# Patient Record
Sex: FEMALE | Race: WHITE | NOT HISPANIC OR LATINO | Employment: OTHER | URBAN - METROPOLITAN AREA
[De-identification: names, ages, dates, MRNs, and addresses within clinical notes are randomized per-mention and may not be internally consistent; named-entity substitution may affect disease eponyms.]

---

## 2017-02-13 ENCOUNTER — APPOINTMENT (OUTPATIENT)
Dept: WOMENS IMAGING | Facility: HOSPITAL | Age: 62
End: 2017-02-13

## 2017-02-13 ENCOUNTER — OFFICE VISIT (OUTPATIENT)
Dept: OBSTETRICS AND GYNECOLOGY | Facility: CLINIC | Age: 62
End: 2017-02-13

## 2017-02-13 ENCOUNTER — PROCEDURE VISIT (OUTPATIENT)
Dept: OBSTETRICS AND GYNECOLOGY | Facility: CLINIC | Age: 62
End: 2017-02-13

## 2017-02-13 VITALS
HEART RATE: 69 BPM | BODY MASS INDEX: 21.68 KG/M2 | WEIGHT: 110.4 LBS | DIASTOLIC BLOOD PRESSURE: 62 MMHG | HEIGHT: 60 IN | SYSTOLIC BLOOD PRESSURE: 126 MMHG

## 2017-02-13 DIAGNOSIS — Z01.411 ENCOUNTER FOR GYNECOLOGICAL EXAMINATION WITH ABNORMAL FINDING: Primary | ICD-10-CM

## 2017-02-13 DIAGNOSIS — N81.2 PELVIC RELAXATION DUE TO UTEROVAGINAL PROLAPSE, INCOMPLETE: ICD-10-CM

## 2017-02-13 DIAGNOSIS — Z12.31 VISIT FOR SCREENING MAMMOGRAM: Primary | ICD-10-CM

## 2017-02-13 PROBLEM — I10 HYPERTENSION: Status: ACTIVE | Noted: 2017-02-13

## 2017-02-13 PROBLEM — E78.5 HYPERLIPIDEMIA: Status: ACTIVE | Noted: 2017-02-13

## 2017-02-13 PROCEDURE — 77067 SCR MAMMO BI INCL CAD: CPT | Performed by: OBSTETRICS & GYNECOLOGY

## 2017-02-13 PROCEDURE — 99406 BEHAV CHNG SMOKING 3-10 MIN: CPT | Performed by: OBSTETRICS & GYNECOLOGY

## 2017-02-13 PROCEDURE — 99396 PREV VISIT EST AGE 40-64: CPT | Performed by: OBSTETRICS & GYNECOLOGY

## 2017-02-13 PROCEDURE — 77067 SCR MAMMO BI INCL CAD: CPT | Performed by: RADIOLOGY

## 2017-02-13 RX ORDER — BISOPROLOL FUMARATE AND HYDROCHLOROTHIAZIDE 10; 6.25 MG/1; MG/1
1 TABLET ORAL
COMMUNITY
Start: 2016-09-30

## 2017-02-13 RX ORDER — LISINOPRIL AND HYDROCHLOROTHIAZIDE 25; 20 MG/1; MG/1
1 TABLET ORAL
COMMUNITY
Start: 2016-09-30

## 2017-02-13 RX ORDER — ATORVASTATIN CALCIUM 20 MG/1
20 TABLET, FILM COATED ORAL
COMMUNITY
Start: 2016-12-08 | End: 2017-02-13 | Stop reason: SDUPTHER

## 2017-02-13 RX ORDER — ATORVASTATIN CALCIUM 20 MG/1
TABLET, FILM COATED ORAL
Refills: 5 | COMMUNITY
Start: 2017-01-11

## 2017-02-13 NOTE — PROGRESS NOTES
Karlos Adames is a 62 y.o. female  2, Para 2 AB 0, Living 2.  Last annual 10/2015, last pap 1y, last mammogram today, last colonoscopy .  Cc: Annual exam  History of Present Illness  She has a history of uterine and vaginal prolapse but is asymptomatic at this time.  No other complaints at this time  The following portions of the patient's history were reviewed and updated as appropriate: allergies, current medications, past family history, past medical history, past social history, past surgical history and problem list.    Review of Systems   Genitourinary:        Denies any vaginal symptoms from her prolapse   All other systems reviewed and are negative.        Past Medical History   Diagnosis Date   • Hyperlipidemia    • Hypertension      Menstrual History:  OB History      Para Term  AB TAB SAB Ectopic Multiple Living    2 2 2       2           No LMP recorded. Patient is postmenopausal.       Past Surgical History   Procedure Laterality Date   • Skin surgery Left      -removal of basal cell left ear     OB History      Para Term  AB TAB SAB Ectopic Multiple Living    2 2 2       2        Family History   Problem Relation Age of Onset   • Cerebral aneurysm Father    • No Known Problems Brother    • No Known Problems Sister    • No Known Problems Son    • No Known Problems Daughter    • Cancer Maternal Aunt      History   Smoking Status   • Current Every Day Smoker   • Packs/day: 0.50   • Years: 50.00   • Types: Cigarettes   Smokeless Tobacco   • Never Used     History   Alcohol Use No     Health Maintenance   Topic Date Due   • PNEUMOCOCCAL VACCINE (19-64 MEDIUM RISK) (1 of 1 - PPSV23) 1972   • TDAP/TD VACCINES (1 - Tdap) 1972   • INFLUENZA VACCINE  2016   • HEPATITIS C SCREENING  2017   • PAP SMEAR  2017   • COLONOSCOPY  2017   • ZOSTER VACCINE  2017   • DXA SCAN  2017       Current Outpatient Prescriptions:  "  •  aspirin 81 MG tablet, Take 81 mg by mouth., Disp: , Rfl:   •  bisoprolol-hydrochlorothiazide (ZIAC) 10-6.25 MG per tablet, Take 1 tablet by mouth., Disp: , Rfl:   •  Calcium Citrate-Vitamin D 250-200 MG-UNIT tablet, Take  by mouth., Disp: , Rfl:   •  lisinopril-hydrochlorothiazide (PRINZIDE,ZESTORETIC) 20-25 MG per tablet, Take 1 tablet by mouth., Disp: , Rfl:   •  atorvastatin (LIPITOR) 20 MG tablet, , Disp: , Rfl: 5  Sexual History: Active  Sexually Transmitted Infection History: None    I advised the patient of the risks in continuing to use tobacco, and I provided this patient with smoking cessation educational materials.  I also discussed how to quit smoking and the patient has expressed the willingness to quit.      During this visit, I spent 3-10 mintues counseling the patient regarding smoking cessation.       Objective   Vitals:    02/13/17 0936   BP: 126/62   Pulse: 69   Weight: 110 lb 6.4 oz (50.1 kg)   Height: 60\" (152.4 cm)     Physical Exam   Constitutional: She is oriented to person, place, and time. She appears well-developed and well-nourished.   Appears older than her stated age   HENT:   Head: Normocephalic.   Eyes: Pupils are equal, round, and reactive to light.   Neck: Normal range of motion. No thyromegaly present.   Cardiovascular: Normal rate, regular rhythm, normal heart sounds and intact distal pulses.    Pulmonary/Chest: Effort normal and breath sounds normal. No respiratory distress. She exhibits no tenderness. Right breast exhibits no inverted nipple, no mass, no nipple discharge, no skin change and no tenderness. Left breast exhibits no inverted nipple, no mass, no nipple discharge, no skin change and no tenderness. Breasts are symmetrical.   Abdominal: Soft. Bowel sounds are normal. Hernia confirmed negative in the right inguinal area and confirmed negative in the left inguinal area.   Genitourinary: Rectum normal, vagina normal and uterus normal. Rectal exam shows no external " hemorrhoid, no internal hemorrhoid, no fissure, no mass, no tenderness, anal tone normal and guaiac negative stool. No breast tenderness or discharge. Pelvic exam was performed with patient supine. There is no rash, tenderness, lesion or injury on the right labia. There is no rash, tenderness, lesion or injury on the left labia. Uterus is not enlarged and not tender. Cervix exhibits no motion tenderness, no discharge and no friability. Right adnexum displays no mass, no tenderness and no fullness. Left adnexum displays no mass, no tenderness and no fullness.   Genitourinary Comments: Grade 2 uterine prolapse, grade 2 cystocele   Lymphadenopathy:     She has no cervical adenopathy.        Right: No inguinal adenopathy present.        Left: No inguinal adenopathy present.   Neurological: She is alert and oriented to person, place, and time. She has normal reflexes.   Skin: Skin is warm and dry.   Psychiatric: She has a normal mood and affect. Her behavior is normal. Judgment and thought content normal.   Vitals reviewed.        Assessment/Plan   Radha was seen today for gynecologic exam.    Diagnoses and all orders for this visit:    Encounter for gynecological examination with abnormal finding    Pelvic relaxation due to uterovaginal prolapse, incomplete  Comments:  Patient declines surgical intervention

## 2018-02-20 ENCOUNTER — OFFICE VISIT (OUTPATIENT)
Dept: OBSTETRICS AND GYNECOLOGY | Facility: CLINIC | Age: 63
End: 2018-02-20

## 2018-02-20 ENCOUNTER — APPOINTMENT (OUTPATIENT)
Dept: WOMENS IMAGING | Facility: HOSPITAL | Age: 63
End: 2018-02-20

## 2018-02-20 ENCOUNTER — PROCEDURE VISIT (OUTPATIENT)
Dept: OBSTETRICS AND GYNECOLOGY | Facility: CLINIC | Age: 63
End: 2018-02-20

## 2018-02-20 VITALS
HEIGHT: 60 IN | DIASTOLIC BLOOD PRESSURE: 62 MMHG | WEIGHT: 112 LBS | HEART RATE: 82 BPM | BODY MASS INDEX: 21.99 KG/M2 | SYSTOLIC BLOOD PRESSURE: 105 MMHG

## 2018-02-20 DIAGNOSIS — Z01.419 PAP SMEAR, AS PART OF ROUTINE GYNECOLOGICAL EXAMINATION: Primary | ICD-10-CM

## 2018-02-20 DIAGNOSIS — Z12.11 COLON CANCER SCREENING: ICD-10-CM

## 2018-02-20 DIAGNOSIS — Z12.31 VISIT FOR SCREENING MAMMOGRAM: Primary | ICD-10-CM

## 2018-02-20 DIAGNOSIS — Z01.411 ENCOUNTER FOR GYNECOLOGICAL EXAMINATION WITH ABNORMAL FINDING: ICD-10-CM

## 2018-02-20 DIAGNOSIS — Z78.0 MENOPAUSE: ICD-10-CM

## 2018-02-20 DIAGNOSIS — N81.4 CYSTOCELE WITH UTERINE PROLAPSE: ICD-10-CM

## 2018-02-20 DIAGNOSIS — Z72.0 TOBACCO USE: ICD-10-CM

## 2018-02-20 LAB — HEMOCCULT STL QL IA: NEGATIVE

## 2018-02-20 PROCEDURE — 99406 BEHAV CHNG SMOKING 3-10 MIN: CPT | Performed by: OBSTETRICS & GYNECOLOGY

## 2018-02-20 PROCEDURE — 77067 SCR MAMMO BI INCL CAD: CPT | Performed by: RADIOLOGY

## 2018-02-20 PROCEDURE — 99397 PER PM REEVAL EST PAT 65+ YR: CPT | Performed by: OBSTETRICS & GYNECOLOGY

## 2018-02-20 PROCEDURE — 82274 ASSAY TEST FOR BLOOD FECAL: CPT | Performed by: OBSTETRICS & GYNECOLOGY

## 2018-02-20 PROCEDURE — 77067 SCR MAMMO BI INCL CAD: CPT | Performed by: OBSTETRICS & GYNECOLOGY

## 2018-02-20 NOTE — PATIENT INSTRUCTIONS
IF YOU SMOKE OR USE TOBACCO PLEASE READ THE FOLLOWING:    Why is smoking bad for me?  Smoking increases the risk of heart disease, lung disease, vascular disease, stroke, and cancer.     If you smoke, STOP!    If you would like more information on quitting smoking, please visit the [employer] website: [email or web address]   This link will provide additional resources including the QUIT line and the Beat the Pack support groups.     For more information:    Quit Now [facility]  1-800-QUIT-NOW  https://[email or web address].quitlogix.org/en-[place]/

## 2018-02-20 NOTE — PROGRESS NOTES
Subjective   Radha Meyers is a 63 y.o. female  2, Para 2 AB 0, Living 2.  Last annual 1y, last pap 1y, last mammogram today, last colonoscopy 10.y  Cc: Annual exam  History of Present Illness  Patient has long history of uterine prolapse which is relatively asymptomatic and otherwise denies any gynecologic problems at this time.  The following portions of the patient's history were reviewed and updated as appropriate: allergies, current medications, past family history, past medical history, past social history, past surgical history and problem list.    Review of Systems   Genitourinary:        Uterine prolapse   All other systems reviewed and are negative.        Past Medical History:   Diagnosis Date   • Basal cell carcinoma    • Hyperlipidemia    • Hypertension      Menstrual History:  OB History      Para Term  AB Living    2 2 2   2    SAB TAB Ectopic Multiple Live Births                 Menarche age: 8  No LMP recorded. Patient is postmenopausal.       Past Surgical History:   Procedure Laterality Date   • SKIN SURGERY Left     -removal of basal cell left ear     OB History      Para Term  AB Living    2 2 2   2    SAB TAB Ectopic Multiple Live Births                Family History   Problem Relation Age of Onset   • Cerebral aneurysm Father    • No Known Problems Brother    • No Known Problems Sister    • No Known Problems Son    • No Known Problems Daughter    • Cancer Maternal Aunt      History   Smoking Status   • Current Every Day Smoker   • Packs/day: 0.50   • Years: 50.00   • Types: Cigarettes   Smokeless Tobacco   • Never Used     History   Alcohol Use No     Health Maintenance   Topic Date Due   • TDAP/TD VACCINES (1 - Tdap) 1972   • HEPATITIS C SCREENING  2017   • MEDICARE ANNUAL WELLNESS  2017   • PAP SMEAR  2017   • DXA SCAN  2017   • INFLUENZA VACCINE  2017   • PNEUMOCOCCAL VACCINES (65+ LOW/MEDIUM RISK) (2 of 2 - PPSV23)  "01/06/2018   • LIPID PANEL  02/20/2018   • COLONOSCOPY  02/20/2018   • MAMMOGRAM  02/20/2019   • ZOSTER VACCINE  Completed       Current Outpatient Prescriptions:   •  aspirin 81 MG tablet, Take 81 mg by mouth., Disp: , Rfl:   •  atorvastatin (LIPITOR) 20 MG tablet, , Disp: , Rfl: 5  •  bisoprolol-hydrochlorothiazide (ZIAC) 10-6.25 MG per tablet, Take 1 tablet by mouth., Disp: , Rfl:   •  Calcium Citrate-Vitamin D 250-200 MG-UNIT tablet, Take  by mouth., Disp: , Rfl:   •  lisinopril-hydrochlorothiazide (PRINZIDE,ZESTORETIC) 20-25 MG per tablet, Take 1 tablet by mouth., Disp: , Rfl:   Sexual History: Active  STD: Negative       I advised the patient of the risks in continuing to use tobacco, and I provided this patient with smoking cessation educational materials.  I also discussed how to quit smoking and the patient has expressed the willingness to quit.      During this visit, I spent > 3-10 minutes counseling the patient regarding smoking cessation.       Objective   Vitals:    02/20/18 0921   BP: 105/62   Pulse: 82   Weight: 50.8 kg (112 lb)   Height: 152.4 cm (60\")     Physical Exam   Constitutional: She is oriented to person, place, and time. She appears well-developed and well-nourished.   Appears older than stated age   HENT:   Head: Normocephalic.   Eyes: Pupils are equal, round, and reactive to light.   Neck: Normal range of motion. No thyromegaly present.   Cardiovascular: Normal rate, regular rhythm, normal heart sounds and intact distal pulses.    Pulmonary/Chest: Effort normal and breath sounds normal. No respiratory distress. She exhibits no tenderness. Right breast exhibits no inverted nipple, no mass, no nipple discharge, no skin change and no tenderness. Left breast exhibits no inverted nipple, no mass, no nipple discharge, no skin change and no tenderness. Breasts are symmetrical.   Abdominal: Soft. Bowel sounds are normal. Hernia confirmed negative in the right inguinal area and confirmed negative " in the left inguinal area.   Genitourinary: Rectum normal, vagina normal and uterus normal. Rectal exam shows no external hemorrhoid, no internal hemorrhoid, no fissure, no mass, no tenderness, anal tone normal and guaiac negative stool. No breast tenderness or discharge. Pelvic exam was performed with patient supine. There is no rash, tenderness, lesion or injury on the right labia. There is no rash, tenderness, lesion or injury on the left labia. Uterus is not enlarged and not tender. Cervix exhibits no motion tenderness, no discharge and no friability. Right adnexum displays no mass, no tenderness and no fullness. Left adnexum displays no mass, no tenderness and no fullness.   Genitourinary Comments: Second degree cystocele with moderate uterine prolapse without Valsalva   Lymphadenopathy:     She has no cervical adenopathy.        Right: No inguinal adenopathy present.        Left: No inguinal adenopathy present.   Neurological: She is alert and oriented to person, place, and time. She has normal reflexes.   Skin: Skin is warm and dry.   Psychiatric: She has a normal mood and affect. Her behavior is normal. Judgment and thought content normal.   Vitals reviewed.        Assessment/Plan   Radha was seen today for gynecologic exam.    Diagnoses and all orders for this visit:    Pap smear, as part of routine gynecological examination  -     IGP, Apt HPV,rfx 16 / 18,45 - ThinPrep Vial, Cervix    Colon cancer screening  -     POC FECAL OCCULT BLOOD BY IMMUNOASSAY    Cystocele with uterine prolapse  Comments:  This is long-standing and is relatively asymptomatic and requires no intervention at this time    Encounter for gynecological examination with abnormal finding    Menopause    Tobacco use

## 2018-02-22 LAB
CYTOLOGIST CVX/VAG CYTO: NORMAL
CYTOLOGY CVX/VAG DOC THIN PREP: NORMAL
DX ICD CODE: NORMAL
HIV 1 & 2 AB SER-IMP: NORMAL
HPV I/H RISK 4 DNA CVX QL PROBE+SIG AMP: NEGATIVE
OTHER STN SPEC: NORMAL
PATH REPORT.FINAL DX SPEC: NORMAL
STAT OF ADQ CVX/VAG CYTO-IMP: NORMAL

## 2018-02-26 ENCOUNTER — TELEPHONE (OUTPATIENT)
Dept: OBSTETRICS AND GYNECOLOGY | Facility: CLINIC | Age: 63
End: 2018-02-26

## 2018-02-26 NOTE — TELEPHONE ENCOUNTER
----- Message from Jah Munoz MD sent at 2/26/2018 10:25 AM EST -----  Tell the patient her mammogram was negative  ----- Message -----     From: Interface, Scans Incoming     Sent: 2/23/2018   4:22 PM       To: Jah Munoz MD

## 2019-02-26 ENCOUNTER — TELEPHONE (OUTPATIENT)
Dept: OBSTETRICS AND GYNECOLOGY | Facility: CLINIC | Age: 64
End: 2019-02-26

## 2019-02-26 NOTE — TELEPHONE ENCOUNTER
Last mammogram was February 2018  Last DXA was 2013.    Please encourage patient to schedule both appointments.  As she is on Medicare, we usually cannot see her for an annual more often than every 2 years.  Please review with patient that if she is not heard from us within 2 weeks of her mammogram and/or DEXA, she should contact us for her results.

## 2019-02-26 NOTE — TELEPHONE ENCOUNTER
Pt called requesting her mammo and bone density. Pt states her last mammo was done in 2013. Please advise.       Pt callback: [phone]

## 2019-03-04 ENCOUNTER — PROCEDURE VISIT (OUTPATIENT)
Dept: OBSTETRICS AND GYNECOLOGY | Facility: CLINIC | Age: 64
End: 2019-03-04

## 2019-03-04 ENCOUNTER — APPOINTMENT (OUTPATIENT)
Dept: WOMENS IMAGING | Facility: HOSPITAL | Age: 64
End: 2019-03-04

## 2019-03-04 DIAGNOSIS — Z00.00 ENCOUNTER FOR SCREENING AND PREVENTATIVE CARE: Primary | ICD-10-CM

## 2019-03-04 DIAGNOSIS — Z12.31 VISIT FOR SCREENING MAMMOGRAM: Primary | ICD-10-CM

## 2019-03-04 DIAGNOSIS — Z78.0 POST-MENOPAUSAL: ICD-10-CM

## 2019-03-04 PROCEDURE — 77067 SCR MAMMO BI INCL CAD: CPT | Performed by: RADIOLOGY

## 2019-03-04 PROCEDURE — 77080 DXA BONE DENSITY AXIAL: CPT | Performed by: OBSTETRICS & GYNECOLOGY

## 2019-03-04 PROCEDURE — 77067 SCR MAMMO BI INCL CAD: CPT | Performed by: OBSTETRICS & GYNECOLOGY

## 2019-03-13 ENCOUNTER — DOCUMENTATION (OUTPATIENT)
Dept: OBSTETRICS AND GYNECOLOGY | Facility: CLINIC | Age: 64
End: 2019-03-13

## 2019-03-13 ENCOUNTER — TELEPHONE (OUTPATIENT)
Dept: OBSTETRICS AND GYNECOLOGY | Facility: CLINIC | Age: 64
End: 2019-03-13

## 2019-03-13 NOTE — PROGRESS NOTES
DXA with osteopenia. Recommend calcium/vitamin D supplementation and weight bearing exercise. Looks like she is on calcium- can supplement with 800IU daily vitamin D. Thanks!

## 2019-03-18 ENCOUNTER — TELEPHONE (OUTPATIENT)
Dept: OBSTETRICS AND GYNECOLOGY | Facility: CLINIC | Age: 64
End: 2019-03-18

## 2020-03-05 ENCOUNTER — APPOINTMENT (OUTPATIENT)
Dept: WOMENS IMAGING | Facility: HOSPITAL | Age: 65
End: 2020-03-05

## 2020-03-05 ENCOUNTER — PROCEDURE VISIT (OUTPATIENT)
Dept: OBSTETRICS AND GYNECOLOGY | Facility: CLINIC | Age: 65
End: 2020-03-05

## 2020-03-05 DIAGNOSIS — Z12.31 VISIT FOR SCREENING MAMMOGRAM: Primary | ICD-10-CM

## 2020-03-05 PROCEDURE — 77067 SCR MAMMO BI INCL CAD: CPT | Performed by: OBSTETRICS & GYNECOLOGY

## 2020-03-05 PROCEDURE — 77063 BREAST TOMOSYNTHESIS BI: CPT | Performed by: OBSTETRICS & GYNECOLOGY

## 2020-03-05 PROCEDURE — 77063 BREAST TOMOSYNTHESIS BI: CPT | Performed by: RADIOLOGY

## 2020-03-05 PROCEDURE — 77067 SCR MAMMO BI INCL CAD: CPT | Performed by: RADIOLOGY

## 2020-08-18 ENCOUNTER — OFFICE VISIT (OUTPATIENT)
Dept: OBSTETRICS AND GYNECOLOGY | Facility: CLINIC | Age: 65
End: 2020-08-18

## 2020-08-18 VITALS
BODY MASS INDEX: 22.78 KG/M2 | WEIGHT: 116 LBS | SYSTOLIC BLOOD PRESSURE: 124 MMHG | HEIGHT: 60 IN | DIASTOLIC BLOOD PRESSURE: 69 MMHG | HEART RATE: 67 BPM

## 2020-08-18 DIAGNOSIS — Z01.419 WELL WOMAN EXAM WITH ROUTINE GYNECOLOGICAL EXAM: Primary | ICD-10-CM

## 2020-08-18 PROBLEM — F17.219 CIGARETTE NICOTINE DEPENDENCE WITH NICOTINE-INDUCED DISORDER: Status: ACTIVE | Noted: 2019-08-07

## 2020-08-18 PROCEDURE — 99397 PER PM REEVAL EST PAT 65+ YR: CPT | Performed by: OBSTETRICS & GYNECOLOGY

## 2020-08-18 NOTE — PROGRESS NOTES
Chief Complaint   Patient presents with   • Annual Exam     pap:  NILM -HPV, mammo: 3/20, colonoscopy: , dexa: 3/19         Radha Meyers is a 65 y.o.  who presents for an annual examination     Pap history:  Last pap: 2018 NIL, HPV negative  Prior abnormal paps: no  DXA:  yes, 2019 - osteopenia (t-score of femoral neck -1.7)  Colonoscopy:  yes, 2018  Mammogram: 2020 - normal  Menopause:  Age: unsure - thinks late 40's or early 50s  Bleeding since? no  Vasomotor symptoms: no    Is patient's family or personal history significant for any risks for BRCA? no    Past Medical History:   Diagnosis Date   • Basal cell carcinoma     skin cancer (left ear)   • Hyperlipidemia    • Hypertension      Past Surgical History:   Procedure Laterality Date   • SKIN SURGERY Left     -removal of basal cell left ear     OB History    Para Term  AB Living   2 2 2     2   SAB TAB Ectopic Molar Multiple Live Births             2      # Outcome Date GA Lbr Jim/2nd Weight Sex Delivery Anes PTL Lv   2 Term      Vag-Spont   DEONDRE   1 Term      Vag-Spont   DEONDRE     Social History     Tobacco Use   • Smoking status: Current Every Day Smoker     Packs/day: 0.50     Years: 50.00     Pack years: 25.00     Types: Cigarettes   • Smokeless tobacco: Never Used   Substance Use Topics   • Alcohol use: No   • Drug use: No     Family History   Problem Relation Age of Onset   • Cerebral aneurysm Father    • No Known Problems Brother    • No Known Problems Sister    • No Known Problems Son    • No Known Problems Daughter    • Cancer Maternal Aunt    • Breast cancer Neg Hx    • Ovarian cancer Neg Hx    • Uterine cancer Neg Hx    • Colon cancer Neg Hx    • Deep vein thrombosis Neg Hx    • Pulmonary embolism Neg Hx      Current Outpatient Medications on File Prior to Visit   Medication Sig Dispense Refill   • aspirin 81 MG tablet Take 81 mg by mouth.     • atorvastatin (LIPITOR) 20 MG tablet   5   •  "bisoprolol-hydrochlorothiazide (ZIAC) 10-6.25 MG per tablet Take 1 tablet by mouth.     • Calcium Citrate-Vitamin D 250-200 MG-UNIT tablet Take  by mouth.     • lisinopril-hydrochlorothiazide (PRINZIDE,ZESTORETIC) 20-25 MG per tablet Take 1 tablet by mouth.       No current facility-administered medications on file prior to visit.      No Known Allergies     Review of Systems   Constitutional: Negative.    HENT: Negative.    Respiratory: Negative.    Cardiovascular: Negative.    Gastrointestinal: Negative.    Endocrine: Negative.    Genitourinary: Negative.    Musculoskeletal: Negative.    Skin: Negative.    Neurological: Negative.    Psychiatric/Behavioral: Negative.        OBJECTIVE:   Vitals:    08/18/20 1003   BP: 124/69   Pulse: 67   Weight: 52.6 kg (116 lb)   Height: 152.4 cm (60\")      Physical Exam   Constitutional: She is oriented to person, place, and time. She appears well-developed and well-nourished. No distress.   HENT:   Head: Normocephalic and atraumatic.   Eyes: EOM are normal. No scleral icterus.   Neck: Normal range of motion. Neck supple. No thyromegaly present.   Cardiovascular: Normal rate and regular rhythm. Exam reveals no gallop and no friction rub.   No murmur heard.  Pulmonary/Chest: Effort normal and breath sounds normal. No respiratory distress. She has no wheezes. She has no rales. She exhibits no tenderness. Right breast exhibits no inverted nipple. Left breast exhibits no inverted nipple. No breast swelling, tenderness, discharge or bleeding. Breasts are symmetrical.   Abdominal: Soft. Bowel sounds are normal. She exhibits no distension. There is no tenderness. There is no guarding.   Genitourinary: Vagina normal and uterus normal. There is no rash, tenderness, lesion, injury or Bartholin's cyst on the right labia. There is no rash, tenderness, lesion, injury or Bartholin's cyst on the left labia. Uterus is not mobile.   Cervix is not parous. Cervix does not exhibit motion tenderness, " discharge, friability, lesion, polyp, nabothian cyst, eversion, pinkness or cyanosis. Right adnexum displays no mass, no tenderness and no fullness. Right adnexum is present.Left adnexum displays no mass, no tenderness and no fullness. Left adnexum is present.No vaginal discharge found.   Musculoskeletal: She exhibits no edema or deformity.   Neurological: She is alert and oriented to person, place, and time.   Skin: Skin is warm and dry. She is not diaphoretic.   Psychiatric: She has a normal mood and affect. Her speech is normal and behavior is normal. Judgment and thought content normal. Cognition and memory are normal.       ASSESSMENT/PLAN:  Annual well woman visit:  Cervical cancer screening:    Reports no h/o cervical dysplasia   The patient has completed pap smear screening.    Screening guidelines discussed with patient  Breast cancer screening:    Mammogram UTD   Breast self awareness encouraged  Colonscopy:   UTD  DXA   osteopenia in 2019 - repeat in 2024   Encouraged continued VitD/Ca and exercise  Family history    does not demonstrate need for genetics referral   Healthy lifestyle counseling:   quit smoking and return for routine annual checkups    Smoking cessation  Patient reports current nicotine user - 7 cigarettes per day  She was counseled on the benefits of smoking cessation including but not limited to cardiovascular benefits.  She is willing to quit.   Quit date: not ready to set yet - is tapering  Declines medication for cessation  Follow up as needed  I spent 5 minutes counseling the patient on benefits of smoking cessation and risks of continuing the behavior.         BMI Counseling  Her BMI is classified as BMI 18.5-24.9       Classification: normal weight.

## 2021-03-19 ENCOUNTER — BULK ORDERING (OUTPATIENT)
Dept: CASE MANAGEMENT | Facility: OTHER | Age: 66
End: 2021-03-19

## 2021-03-19 DIAGNOSIS — Z23 IMMUNIZATION DUE: ICD-10-CM

## 2021-05-05 ENCOUNTER — APPOINTMENT (OUTPATIENT)
Dept: WOMENS IMAGING | Facility: HOSPITAL | Age: 66
End: 2021-05-05

## 2021-05-05 ENCOUNTER — PROCEDURE VISIT (OUTPATIENT)
Dept: OBSTETRICS AND GYNECOLOGY | Facility: CLINIC | Age: 66
End: 2021-05-05

## 2021-05-05 DIAGNOSIS — Z12.31 VISIT FOR SCREENING MAMMOGRAM: Primary | ICD-10-CM

## 2021-05-05 PROCEDURE — 77067 SCR MAMMO BI INCL CAD: CPT | Performed by: RADIOLOGY

## 2021-05-05 PROCEDURE — 77063 BREAST TOMOSYNTHESIS BI: CPT | Performed by: OBSTETRICS & GYNECOLOGY

## 2021-05-05 PROCEDURE — 77063 BREAST TOMOSYNTHESIS BI: CPT | Performed by: RADIOLOGY

## 2021-05-05 PROCEDURE — 77067 SCR MAMMO BI INCL CAD: CPT | Performed by: OBSTETRICS & GYNECOLOGY

## 2022-08-12 ENCOUNTER — PROCEDURE VISIT (OUTPATIENT)
Dept: OBSTETRICS AND GYNECOLOGY | Facility: CLINIC | Age: 67
End: 2022-08-12

## 2022-08-12 ENCOUNTER — APPOINTMENT (OUTPATIENT)
Dept: WOMENS IMAGING | Facility: HOSPITAL | Age: 67
End: 2022-08-12

## 2022-08-12 DIAGNOSIS — Z12.31 VISIT FOR SCREENING MAMMOGRAM: Primary | ICD-10-CM

## 2022-08-12 PROCEDURE — 77067 SCR MAMMO BI INCL CAD: CPT | Performed by: RADIOLOGY

## 2022-08-12 PROCEDURE — 77067 SCR MAMMO BI INCL CAD: CPT | Performed by: OBSTETRICS & GYNECOLOGY

## 2022-08-12 PROCEDURE — 77063 BREAST TOMOSYNTHESIS BI: CPT | Performed by: RADIOLOGY

## 2022-08-12 PROCEDURE — 77063 BREAST TOMOSYNTHESIS BI: CPT | Performed by: OBSTETRICS & GYNECOLOGY

## 2023-10-13 ENCOUNTER — OFFICE VISIT (OUTPATIENT)
Dept: OBSTETRICS AND GYNECOLOGY | Facility: CLINIC | Age: 68
End: 2023-10-13
Payer: MEDICARE

## 2023-10-13 ENCOUNTER — PROCEDURE VISIT (OUTPATIENT)
Dept: OBSTETRICS AND GYNECOLOGY | Facility: CLINIC | Age: 68
End: 2023-10-13
Payer: MEDICARE

## 2023-10-13 VITALS
BODY MASS INDEX: 23.75 KG/M2 | WEIGHT: 121 LBS | HEIGHT: 60 IN | DIASTOLIC BLOOD PRESSURE: 70 MMHG | SYSTOLIC BLOOD PRESSURE: 129 MMHG

## 2023-10-13 DIAGNOSIS — N39.41 URGE INCONTINENCE: ICD-10-CM

## 2023-10-13 DIAGNOSIS — Z78.0 MENOPAUSE: ICD-10-CM

## 2023-10-13 DIAGNOSIS — Z12.31 VISIT FOR SCREENING MAMMOGRAM: Primary | ICD-10-CM

## 2023-10-13 DIAGNOSIS — Z01.419 WELL WOMAN EXAM WITH ROUTINE GYNECOLOGICAL EXAM: Primary | ICD-10-CM

## 2023-10-13 NOTE — PROGRESS NOTES
Chief Complaint   Patient presents with    Annual Exam     Last ae: 2020  Last pap: 2018  Mammo today         Radha Meyers is a 68 y.o.  who presents for an annual examination     Pap history:  Last pap: 2018 NIL, HPV negative  Prior abnormal paps: no  DXA:  yes, 2019 - osteopenia (t-score of femoral neck -1.7)  Colonoscopy:  yes, 2018  Mammogram: 10/13/23   Menopause:  Age: unsure - thinks late 40's or early 50s  Bleeding since? no  Vasomotor symptoms: no  Urge Incontinence, but not bad enough she wants to do anything about it.     Is patient's family or personal history significant for any risks for BRCA? no    Past Medical History:   Diagnosis Date    Basal cell carcinoma     skin cancer (left ear)    Hyperlipidemia     Hypertension      Past Surgical History:   Procedure Laterality Date    SKIN SURGERY Left     -removal of basal cell left ear     OB History    Para Term  AB Living   2 2 2     2   SAB IAB Ectopic Molar Multiple Live Births             2      # Outcome Date GA Lbr Jim/2nd Weight Sex Delivery Anes PTL Lv   2 Term      Vag-Spont   DEONDRE   1 Term      Vag-Spont   DEONDRE     Social History     Tobacco Use    Smoking status: Every Day     Packs/day: 0.50     Years: 50.00     Additional pack years: 0.00     Total pack years: 25.00     Types: Cigarettes    Smokeless tobacco: Never   Substance Use Topics    Alcohol use: No    Drug use: No     Family History   Problem Relation Age of Onset    Cerebral aneurysm Father     No Known Problems Brother     No Known Problems Sister     No Known Problems Son     No Known Problems Daughter     Cancer Maternal Aunt     Breast cancer Neg Hx     Ovarian cancer Neg Hx     Uterine cancer Neg Hx     Colon cancer Neg Hx     Deep vein thrombosis Neg Hx     Pulmonary embolism Neg Hx      Current Outpatient Medications on File Prior to Visit   Medication Sig Dispense Refill    aspirin 81 MG tablet Take 1 tablet by mouth.       "atorvastatin (LIPITOR) 20 MG tablet   5    bisoprolol-hydrochlorothiazide (ZIAC) 10-6.25 MG per tablet Take 1 tablet by mouth.      Calcium Citrate-Vitamin D 250-200 MG-UNIT tablet Take  by mouth.      lisinopril-hydrochlorothiazide (PRINZIDE,ZESTORETIC) 20-25 MG per tablet Take 1 tablet by mouth.       No current facility-administered medications on file prior to visit.     No Known Allergies     Review of Systems   Constitutional: Negative.    HENT: Negative.     Respiratory: Negative.     Cardiovascular: Negative.    Gastrointestinal: Negative.    Endocrine: Negative.    Genitourinary: Negative.    Musculoskeletal: Negative.    Skin: Negative.    Neurological: Negative.    Psychiatric/Behavioral: Negative.         OBJECTIVE:   Vitals:    10/13/23 1013   BP: 129/70   Weight: 54.9 kg (121 lb)   Height: 152.4 cm (60\")      Physical Exam   Constitutional: She is oriented to person, place, and time. She appears well-developed and well-nourished. No distress.   HENT:   Head: Normocephalic and atraumatic.   Eyes: No scleral icterus.   Neck: No thyromegaly present.   Cardiovascular: Normal rate and regular rhythm. Exam reveals no gallop and no friction rub.   No murmur heard.  Pulmonary/Chest: Effort normal and breath sounds normal. No respiratory distress. She has no wheezes. She has no rales. She exhibits no tenderness. Right breast exhibits no inverted nipple. Left breast exhibits no inverted nipple. No breast swelling, tenderness, discharge or bleeding. Breasts are symmetrical.   Abdominal: Soft. Bowel sounds are normal. She exhibits no distension. There is no abdominal tenderness. There is no guarding.   Genitourinary: Vagina normal and uterus normal. There is no rash, tenderness, lesion, injury or Bartholin's cyst on the right labia. There is no rash, tenderness, lesion, injury or Bartholin's cyst on the left labia. Uterus is not mobile.   Cervix is not parous. Cervix does not exhibit motion tenderness, discharge, " friability, lesion, polyp, nabothian cyst, eversion, pinkness or cyanosis. Right adnexum displays no mass, no tenderness and no fullness. Right adnexum is present.Left adnexum displays no mass, no tenderness and no fullness. Left adnexum is present.No vaginal discharge found.   Genitourinary Comments: Urethral meatus: normal location, size without prolapse  Urethra: no masses, tenderness, scarring  Bladder: nontender anterior vagina         Musculoskeletal: No deformity.   Neurological: She is alert and oriented to person, place, and time.   Skin: Skin is warm and dry. She is not diaphoretic.   Psychiatric: Her speech is normal and behavior is normal. Judgment and thought content normal.       ASSESSMENT/PLAN:  Annual well woman visit:  Cervical cancer screening:    Reports no h/o cervical dysplasia   The patient has completed pap smear screening.    Screening guidelines discussed with patient  Breast cancer screening:    Mammogram UTD   Breast self awareness encouraged  Colonscopy:   UTD  DXA   osteopenia in 2019 - repeat in 2024   Encouraged continued VitD/Ca and exercise  Family history    does not demonstrate need for genetics referral   Healthy lifestyle counseling:   quit smoking and return for routine annual checkups  Smoking cessation  She is smoking 3-5 cigarettes per day.          BMI Counseling  Her BMI is classified as BMI 18.5-24.9       Classification: normal weight.

## 2024-04-16 ENCOUNTER — HOSPITAL ENCOUNTER (OUTPATIENT)
Dept: BONE DENSITY | Facility: HOSPITAL | Age: 69
Discharge: HOME | End: 2024-04-16
Admitting: OBSTETRICS & GYNECOLOGY
Payer: MEDICARE

## 2024-04-16 DIAGNOSIS — Z78.0 MENOPAUSE: ICD-10-CM

## 2024-04-16 PROCEDURE — 77080 DXA BONE DENSITY AXIAL: CPT

## 2024-04-18 ENCOUNTER — TELEPHONE (OUTPATIENT)
Dept: OBSTETRICS AND GYNECOLOGY | Facility: CLINIC | Age: 69
End: 2024-04-18
Payer: MEDICARE

## 2024-04-18 NOTE — PROGRESS NOTES
Ok for hub/fo to relay    Lmtcb    Per ,    Please let patient know that bone density showed osteopenia, but there is high risk of fracture in the hip, so would recommend making an appointment to discuss treatment options. Okay to use any open spot. Thanks!

## 2024-07-02 ENCOUNTER — TELEPHONE (OUTPATIENT)
Dept: OBSTETRICS AND GYNECOLOGY | Facility: CLINIC | Age: 69
End: 2024-07-02

## 2024-07-02 ENCOUNTER — OFFICE VISIT (OUTPATIENT)
Dept: OBSTETRICS AND GYNECOLOGY | Facility: CLINIC | Age: 69
End: 2024-07-02
Payer: MEDICARE

## 2024-07-02 VITALS
DIASTOLIC BLOOD PRESSURE: 68 MMHG | HEART RATE: 86 BPM | BODY MASS INDEX: 22.65 KG/M2 | SYSTOLIC BLOOD PRESSURE: 128 MMHG | WEIGHT: 116 LBS

## 2024-07-02 DIAGNOSIS — M85.80 OSTEOPENIA, UNSPECIFIED LOCATION: Primary | ICD-10-CM

## 2024-07-02 DIAGNOSIS — N28.9 KIDNEY DYSFUNCTION: ICD-10-CM

## 2024-07-02 RX ORDER — LANOLIN ALCOHOL/MO/W.PET/CERES
1000 CREAM (GRAM) TOPICAL DAILY
COMMUNITY

## 2024-07-02 NOTE — PATIENT INSTRUCTIONS
Www.nof.org  - [employer]  Patient website:  https://www.pathtogoodbonehealth.org/path-3-how-do-i-manage-osteoporosis/[email or web address]_categories=part-4-is-my-treatment-plan-working

## 2024-07-02 NOTE — TELEPHONE ENCOUNTER
"Dr. Frank called to let you know that \"patient's biphosphates are fine from a renal standpoint.\"     Thanks!  "

## 2024-07-02 NOTE — LETTER
2024     Saul Frank MD  [address]  [address]  [place] [id number]    Patient: Radha Meyers   YOB: 1955   Date of Visit: 2024     Dear Dr. Frank:    We have a mutual patient, Ms. Adames.  She was seeing me today for osteopenia with a high risk FRAX.  I understand she is under your care for CKD -3a.  We had discussed starting a bisphosphonate such as Ibandronate with monthly dosing, however I wanted to make you aware of this before starting. I understand some patients with CKD can have metabolic bone disease and renal osteodystrophy.  I also understand there is some hesitancy in starting bisphosphonates with Stage 4-5 CKD.  I would be happy to discuss this further if you would like to reach out and appreciate any input you have in initiating this treatment or if further testing is needed. It is my understanding that she is seeing you on 24.    Thank you for your consideration in this matter.         Sincerely,        Ara Russell MD        CC: No Recipients      Progress Notes:  SUBJECTIVE:   Chief Complaint   Patient presents with   • Follow-up     Here for follow up to dexa scan.        Radha Meyers is a 69 y.o.  who presents for osteopenia with an elevated FRAX. Vit D and Ca in 2024 were normal.  She is being seen by Dr. Frank for kidney disease, next labs are  and next appointment is .     FINDINGS:  LUMBAR SPINE:  The BMD measured in L2, L3, L4 is 0.863 g/cm2 for a  T-score of -2.0 and a Z-score of 0.3.     LEFT HIP: The BMD for the femoral neck is 0.643 g/cm2 for a T score of  -1.9 and a Z score of 0.0.     RIGHT HIP:  The BMD for the femoral neck is 0.627 g/cm2 for a T score of  -2.0 and a Z score of -0.1.     IMPRESSION:  1.  Osteopenia.  2.  The 10-year FRAX (World Health Organization) fracture risk for a  major osteoporotic fracture is 12% and for a hip fracture is 3.7%.    Past Medical History:   Diagnosis Date   • Basal cell carcinoma      skin cancer (left ear)   • Hyperlipidemia    • Hypertension      Past Surgical History:   Procedure Laterality Date   • SKIN SURGERY Left     -removal of basal cell left ear     OB History    Para Term  AB Living   2 2 2     2   SAB IAB Ectopic Molar Multiple Live Births             2      # Outcome Date GA Lbr Jim/2nd Weight Sex Type Anes PTL Lv   2 Term      Vag-Spont   DEONDRE   1 Term      Vag-Spont   DEONDRE      Social History     Tobacco Use   • Smoking status: Every Day     Current packs/day: 0.50     Average packs/day: 0.5 packs/day for 50.0 years (25.0 ttl pk-yrs)     Types: Cigarettes   • Smokeless tobacco: Never   Substance Use Topics   • Alcohol use: No   • Drug use: No     Family History   Problem Relation Age of Onset   • Cerebral aneurysm Father    • No Known Problems Brother    • No Known Problems Sister    • No Known Problems Son    • No Known Problems Daughter    • Cancer Maternal Aunt    • Breast cancer Neg Hx    • Ovarian cancer Neg Hx    • Uterine cancer Neg Hx    • Colon cancer Neg Hx    • Deep vein thrombosis Neg Hx    • Pulmonary embolism Neg Hx      Current Outpatient Medications on File Prior to Visit   Medication Sig Dispense Refill   • aspirin 81 MG tablet Take 1 tablet by mouth.     • atorvastatin (LIPITOR) 20 MG tablet   5   • bisoprolol-hydrochlorothiazide (ZIAC) 10-6.25 MG per tablet Take 1 tablet by mouth.     • FIBER ADULT GUMMIES PO Take 2 tablets by mouth.     • lisinopril-hydrochlorothiazide (PRINZIDE,ZESTORETIC) 20-25 MG per tablet Take 1 tablet by mouth.     • vitamin B-12 (CYANOCOBALAMIN) 1000 MCG tablet Take 1 tablet by mouth Daily.     • [DISCONTINUED] Calcium Citrate-Vitamin D 250-200 MG-UNIT tablet Take  by mouth.       No current facility-administered medications on file prior to visit.     No Known Allergies     Review of Systems      OBJECTIVE:   Vitals:    24 1243   BP: 128/68   Pulse: 86   Weight: 52.6 kg (116 lb)      Physical Exam  Constitutional:        General: She is not in acute distress.     Appearance: She is well-developed. She is not diaphoretic.   HENT:      Head: Normocephalic and atraumatic.   Eyes:      General: No scleral icterus.     Extraocular Movements: Extraocular movements intact.   Pulmonary:      Effort: Pulmonary effort is normal. No respiratory distress.   Skin:     General: Skin is warm and dry.   Neurological:      General: No focal deficit present.      Mental Status: She is alert and oriented to person, place, and time.   Psychiatric:         Mood and Affect: Mood normal.         Behavior: Behavior normal.         Thought Content: Thought content normal.         Judgment: Judgment normal.         ASSESSMENT/PLAN:     ICD-10-CM ICD-9-CM   1. Osteopenia, unspecified location  M85.80 733.90   2. Kidney dysfunction  N28.9 593.9       Patient was counseled on treatment options. She is being followed for CKD - 3a with Dr. Frank and has a follow up in approximately one month.   In an article published in 2020 (https://www.ncbi.nlm.nih.gov/pmc/articles/ZJH9304226/), general guidelines should be appropriate in patients without CKD MBD although there is a lack of data in patients with CKD 4-5D.  Will reach out to her nephrologist and she will call us if she has not heard from us by one week after her visit with him.      No orders of the defined types were placed in this encounter.      No follow-ups on file.

## 2024-07-02 NOTE — PROGRESS NOTES
SUBJECTIVE:   Chief Complaint   Patient presents with    Follow-up     Here for follow up to dexa scan.        Radha Meyers is a 69 y.o.  who presents for osteopenia with an elevated FRAX. Vit D and Ca in 2024 were normal.  She is being seen by Dr. Frank for kidney disease, next labs are  and next appointment is .     FINDINGS:  LUMBAR SPINE:  The BMD measured in L2, L3, L4 is 0.863 g/cm2 for a  T-score of -2.0 and a Z-score of 0.3.     LEFT HIP: The BMD for the femoral neck is 0.643 g/cm2 for a T score of  -1.9 and a Z score of 0.0.     RIGHT HIP:  The BMD for the femoral neck is 0.627 g/cm2 for a T score of  -2.0 and a Z score of -0.1.     IMPRESSION:  1.  Osteopenia.  2.  The 10-year FRAX (World Health Organization) fracture risk for a  major osteoporotic fracture is 12% and for a hip fracture is 3.7%.    Past Medical History:   Diagnosis Date    Basal cell carcinoma     skin cancer (left ear)    Hyperlipidemia     Hypertension      Past Surgical History:   Procedure Laterality Date    SKIN SURGERY Left     -removal of basal cell left ear     OB History    Para Term  AB Living   2 2 2     2   SAB IAB Ectopic Molar Multiple Live Births             2      # Outcome Date GA Lbr Jim/2nd Weight Sex Type Anes PTL Lv   2 Term      Vag-Spont   DEONDRE   1 Term      Vag-Spont   DEONDRE      Social History     Tobacco Use    Smoking status: Every Day     Current packs/day: 0.50     Average packs/day: 0.5 packs/day for 50.0 years (25.0 ttl pk-yrs)     Types: Cigarettes    Smokeless tobacco: Never   Substance Use Topics    Alcohol use: No    Drug use: No     Family History   Problem Relation Age of Onset    Cerebral aneurysm Father     No Known Problems Brother     No Known Problems Sister     No Known Problems Son     No Known Problems Daughter     Cancer Maternal Aunt     Breast cancer Neg Hx     Ovarian cancer Neg Hx     Uterine cancer Neg Hx     Colon cancer Neg Hx     Deep vein thrombosis Neg  Hx     Pulmonary embolism Neg Hx      Current Outpatient Medications on File Prior to Visit   Medication Sig Dispense Refill    aspirin 81 MG tablet Take 1 tablet by mouth.      atorvastatin (LIPITOR) 20 MG tablet   5    bisoprolol-hydrochlorothiazide (ZIAC) 10-6.25 MG per tablet Take 1 tablet by mouth.      FIBER ADULT GUMMIES PO Take 2 tablets by mouth.      lisinopril-hydrochlorothiazide (PRINZIDE,ZESTORETIC) 20-25 MG per tablet Take 1 tablet by mouth.      vitamin B-12 (CYANOCOBALAMIN) 1000 MCG tablet Take 1 tablet by mouth Daily.      [DISCONTINUED] Calcium Citrate-Vitamin D 250-200 MG-UNIT tablet Take  by mouth.       No current facility-administered medications on file prior to visit.     No Known Allergies     Review of Systems      OBJECTIVE:   Vitals:    07/02/24 1243   BP: 128/68   Pulse: 86   Weight: 52.6 kg (116 lb)      Physical Exam  Constitutional:       General: She is not in acute distress.     Appearance: She is well-developed. She is not diaphoretic.   HENT:      Head: Normocephalic and atraumatic.   Eyes:      General: No scleral icterus.     Extraocular Movements: Extraocular movements intact.   Pulmonary:      Effort: Pulmonary effort is normal. No respiratory distress.   Skin:     General: Skin is warm and dry.   Neurological:      General: No focal deficit present.      Mental Status: She is alert and oriented to person, place, and time.   Psychiatric:         Mood and Affect: Mood normal.         Behavior: Behavior normal.         Thought Content: Thought content normal.         Judgment: Judgment normal.         ASSESSMENT/PLAN:     ICD-10-CM ICD-9-CM   1. Osteopenia, unspecified location  M85.80 733.90   2. Kidney dysfunction  N28.9 593.9       Patient was counseled on treatment options. She is being followed for CKD - 3a with Dr. Frank and has a follow up in approximately one month.   In an article published in 2020 (https://www.ncbi.nlm.nih.gov/pmc/articles/BJP1698905/), general  guidelines should be appropriate in patients without CKD MBD although there is a lack of data in patients with CKD 4-5D.  Will reach out to her nephrologist and she will call us if she has not heard from us by one week after her visit with him.      No orders of the defined types were placed in this encounter.      No follow-ups on file.

## 2024-07-03 ENCOUNTER — TELEPHONE (OUTPATIENT)
Dept: OBSTETRICS AND GYNECOLOGY | Facility: CLINIC | Age: 69
End: 2024-07-03
Payer: MEDICARE

## 2024-07-03 RX ORDER — IBANDRONATE SODIUM 150 MG/1
150 TABLET, FILM COATED ORAL
Qty: 3 TABLET | Refills: 4 | Status: SHIPPED | OUTPATIENT
Start: 2024-07-03

## 2024-07-03 NOTE — TELEPHONE ENCOUNTER
Thank you, please call patient and let her know that we will send the Boniva to her pharmacy to be taken once per month on an empty stomach at least 60 min before eating. It should be taken with at least 6-8 oz of plain water.  Do not lie down for at least 60 min after taking boniva.  You may only drink plain water for the sixty minutes after taking it.  After one hour, you may eat/drink normally.

## 2024-07-03 NOTE — TELEPHONE ENCOUNTER
Caller: Radha Meyers    Relationship: Self    Best call back number: [phone]    What is the best time to reach you: ANY    Who are you requesting to speak with (clinical staff, provider,  specific staff member): SANDRA    Do you know the name of the person who called: SANDRA    What was the call regarding: INSTRUCTIONS FOR MED    Is it okay if the provider responds through MyChart: YES, BUT PREFERS PHONE CALL

## 2024-07-05 NOTE — TELEPHONE ENCOUNTER
Caller: Radha Meyers    Relationship: Self    Best call back number: [phone]    What is the best time to reach you: ANY    Who are you requesting to speak with (clinical staff, provider,  specific staff member):     DR TORRES    Do you know the name of the person who called:     DR TORRES    What was the call regarding:     UNSURE

## 2024-10-15 ENCOUNTER — PROCEDURE VISIT (OUTPATIENT)
Dept: OBSTETRICS AND GYNECOLOGY | Facility: CLINIC | Age: 69
End: 2024-10-15
Payer: MEDICARE

## 2024-10-15 DIAGNOSIS — Z12.31 VISIT FOR SCREENING MAMMOGRAM: Primary | ICD-10-CM

## 2025-04-10 ENCOUNTER — TELEPHONE (OUTPATIENT)
Dept: OBSTETRICS AND GYNECOLOGY | Facility: CLINIC | Age: 70
End: 2025-04-10
Payer: MEDICARE

## 2025-04-10 DIAGNOSIS — M85.80 OSTEOPENIA, UNSPECIFIED LOCATION: Primary | ICD-10-CM

## 2025-04-10 NOTE — TELEPHONE ENCOUNTER
Dr Russell said thank you.  The labs had not been pulled into the chart as they were in a different system so the last she could see prior to knowing this was the round in Sept 2024 where no Vitamin D was drawn. Vit D and calcium normal.  Would just repeat next year (approx Dec 2025).

## 2025-04-10 NOTE — TELEPHONE ENCOUNTER
Thank you.  The labs had not been pulled into the chart as they were in a different system so the last I could see prior to knowing this was the round in Sept 2024 where no Vitamin D was drawn. Vit D and calcium normal.  Would just repeat next year (approx Dec 2025).

## 2025-04-10 NOTE — TELEPHONE ENCOUNTER
CUAUHTEMOC from [facility]. PATIENT CALLING IN ABOUT LABS THAT DR. TORRES HAS REQUESTED TO BE DONE. DR. TORRES HAS REQUESTED VIT D AND CMP, PATIENT HAD THESE LABS DONE ON 12.30.24 LABS ARE IN CHART UNDER CARE EVERYWHERE VENICE AGUIRRE AND DR. LIZ ALEXANDER WAS THE ORDERING DR.       PATIENT WOULD LIKE TO KNOW ARE THESE LABS NEEDED IF SHE JUST HAD THESE LABS DONE ON 12.30.24.

## 2025-04-10 NOTE — TELEPHONE ENCOUNTER
Provider: DR. TORRES    Caller: Radha Meyers    Relationship to Patient: Self    Pharmacy:     Phone Number: [phone]    Reason for Call: LABS ONLY    When was the patient last seen: 07.02.24    PATIENT CALLING IN ABOUT LABS THAT DR. TORRES HAS REQUESTED TO BE DONE. DR. TORRES HAS REQUESTED VIT D AND CMP, PATIENT HAD THESE LABS DONE ON 12.30.24 LABS ARE IN CHART UNDER CARE EVERYWHERE VENICE AGUIRRE AND DR. LIZ ALEXANDER WAS THE ORDERING DR.     PATIENT WOULD LIKE TO KNOW ARE THESE LABS NEEDED IF SHE JUST HAD THESE LABS DONE ON 12.30.24.    PATIENT CAN BE REACHED .[phone]    THANK YOU